# Patient Record
Sex: FEMALE | Race: WHITE
[De-identification: names, ages, dates, MRNs, and addresses within clinical notes are randomized per-mention and may not be internally consistent; named-entity substitution may affect disease eponyms.]

---

## 2020-01-25 ENCOUNTER — HOSPITAL ENCOUNTER (EMERGENCY)
Dept: HOSPITAL 25 - ED | Age: 20
Discharge: HOME | End: 2020-01-25
Payer: SELF-PAY

## 2020-01-25 VITALS — SYSTOLIC BLOOD PRESSURE: 114 MMHG | DIASTOLIC BLOOD PRESSURE: 81 MMHG

## 2020-01-25 DIAGNOSIS — F10.929: Primary | ICD-10-CM

## 2020-01-25 PROCEDURE — 80320 DRUG SCREEN QUANTALCOHOLS: CPT

## 2020-01-25 PROCEDURE — 36415 COLL VENOUS BLD VENIPUNCTURE: CPT

## 2020-01-25 PROCEDURE — 99282 EMERGENCY DEPT VISIT SF MDM: CPT

## 2020-01-25 PROCEDURE — G0480 DRUG TEST DEF 1-7 CLASSES: HCPCS

## 2020-01-25 NOTE — ED
Substance Abuse/Use





- HPI Summary


HPI Summary: 


The pt is an 19 yr old [facility]. Per EMS

, friends report they were drinking excessively earlier today. LEVEL 5 caveat, 

pt is unable to provide full history due to intoxication. 








- History Of Current Complaint


Chief Complaint: EDSubstanceAbuse


Stated Complaint: ETOH/FALL PER FRIEND


Time Seen by Provider: 01/25/20 01:26


Hx Obtained From: Patient


Hx From Patient Unobtainable Due To: Other - LEVEL 5 caveat, pt is unable to 

provide full history due to intoxication.


Ingestion History: Type/Name Of Drug - alcohol


Overdose Characteristics: Oral


Associated Signs And Symptoms: Vomiting





PMH/Surg Hx/FS Hx/Imm Hx





- Surgical History


Other Surgical History: LEVEL 5 caveat, pt is unable to provide full history 

due to intoxication.


Infectious Disease History: No


Infectious Disease History: 


   Denies: Traveled Outside the [place] in Last 30 Days





- Family History


Family History: LEVEL 5 caveat, pt is unable to provide full history due to 

intoxication.





- Social History


Alcohol Use: today


Substance Use Type: Reports: Other


Substance Use Comment - Amount & Last Used: unknown


Smoking Status (MU): Unknown if Ever Smoked





Review of Systems


Positive: Vomiting


All Other Systems Reviewed And Are Negative: No





- Comments


Additional Review of Systems Comments: 





LEVEL 5 caveat, pt is unable to provide full history due to intoxication.





Physical Exam





- Summary


Physical Exam Summary: 


Appearance: Well-appearing, Well-nourished, lying in bed comfortably [add in 

things like actively vomiting, combative, etc as appropriate]


Skin: Warm, dry, no obvious rash


Eyes: sclera anicteric, no conjunctival pallor


ENT: mucous membranes moist, pharynx appears normal


Neck: Supple, nontender


Respiratory: Clear to auscultation, no signs of respiratory distress


Cardiovascular: Normal S1, S2. No murmurs. Normal distal pulses in tibial and 

radial bilaterally.


Abdomen: Soft, nontender, normal active bowel sounds present


Musculoskeletal: Normal, Strength/ROM Intact


Neurological: Awake, alert, able to answer questions although not always 

appropriate, can tell me her name but not where she goes to college.


Psychiatric: Not anxious





Triage Information Reviewed: Yes


Vital Signs On Initial Exam: 


 Initial Vitals











Temp Pulse Resp BP Pulse Ox


 


 97.8 F   124   22   115/85   97 


 


 01/25/20 01:12  01/25/20 01:12  01/25/20 01:12  01/25/20 01:12  01/25/20 01:12











Vital Signs Reviewed: Yes


Completion Of Physical Exam Limited Due To: Level 5 - LEVEL 5 caveat, pt is 

unable to provide full history due to intoxication.





Procedures





- Sedation


Patient Received Moderate/Deep Sedation with Procedure: No





Diagnostics





- Vital Signs


 Vital Signs











  Temp Pulse Resp BP Pulse Ox


 


 01/25/20 02:30   80   99/58  100


 


 01/25/20 02:01   75    99


 


 01/25/20 02:00   79   107/73  100


 


 01/25/20 01:30   117   130/90  100


 


 01/25/20 01:27   56    98


 


 01/25/20 01:12  97.8 F  124  22  115/85  97














- Laboratory


Lab Results: 


 Lab Results











  01/25/20 Range/Units





  01:45 


 


Serum Alcohol  268 H  (<10)  mg/dL











Lab Statement: Any lab studies that have been ordered have been reviewed, and 

results considered in the medical decision making process.





Course/Dx





- Course


Course Of Treatment: The pt is an 19 yr old female [facility] EtOH 

intoxication. They were drinking excessively earlier today. LEVEL 5 caveat, pt 

is unable to provide full history due to intoxication. Pt will be a sign out to 

Dr. Garcia at the 0700 1/25/2020 shift change pending sobriety and disposition.





- Diagnoses


Provider Diagnoses: 


 Alcohol intoxication








Discharge ED





- Sign-Out/Discharge


Documenting (check all that apply): Sign-Out Patient - Pt will be a sign out to 

Dr. Garcia at the 0700 1/25/2020 shift change pending sobriety and disposition.


Signing out patient TO: Virgil Garcia





- Discharge Plan


Condition: Stable


Disposition: HOME


Patient Education Materials:  Alcohol Intoxication (ED)


Referrals: 


[facility] @  [Outside]





- Billing Disposition and Condition


Condition: STABLE


Disposition: Home





- Attestation Statements


Document Initiated by Doroteoe: Yes


Documenting Scribe: Flaquito Dangelo


Provider For Whom Dunia is Documenting (Include Credential): Yehuda Bauer MD


Scribe Attestation: 


Flaquito PORTER, scribed for Yehuda Bauer MD on 01/27/20 at 1614. 


Scribe Documentation Reviewed: Yes


Provider Attestation: 


The documentation as recorded by the Flaquito caldera accurately reflects the 

service I personally performed and the decisions made by me, Yehuda Bauer MD


Status of Scribe Document: Viewed

## 2020-01-25 NOTE — ED
Progress





- Progress Note


Progress Note: 





Dr. Garcia receives patient as sign-out from Dr. Bauer at 0700 1/25/20 

pending sobriety and disposition. 





Course/Dx





- Course


Course Of Treatment: Pt is sign out to Dr. Garcia at the 0700 1/25/2020 shift 

change by Dr. Bauer pending sobriety and disposition. Pt will be discharged 

with dx alcohol intoxication with follow up at [facility].





- Diagnoses


Provider Diagnoses: 


 Alcohol intoxication








Discharge ED





- Sign-Out/Discharge


Documenting (check all that apply): Patient Departure - discharge


Receiving patient FROM: Yehuda Bauer





- Discharge Plan


Condition: Stable


Disposition: HOME


Patient Education Materials:  Alcohol Intoxication (ED)


Referrals: 


[facility] [Outside]





- Billing Disposition and Condition


Condition: STABLE


Disposition: Home





- Attestation Statements


Document Initiated by Dunia: Yes


Documenting Scribe: Ruthy Velasquez


Provider For Whom Dunia is Documenting (Include Credential): Dr. Chaitanya Garcia D.O.


Scribe Attestation: 


Ruthy PORTER, scribed for Dr. Chaitanya Garcia D.O. on 01/25/20 at 

1033. 


Scribe Documentation Reviewed: Yes


Provider Attestation: 


The documentation as recorded by the Ruthy caldera accurately reflects the 

service I personally performed and the decisions made by me, Dr. Chaitanya Garcia D.O.


Status of Scribe Document: Viewed